# Patient Record
(demographics unavailable — no encounter records)

---

## 2017-02-16 NOTE — HM
Date Performed: 02/15/2017       Time Performed: 12:45:00

 

HOOKUP DATE:      02/15/17 12:45:00 PM Wed 

 

     

ANALYSIS START TIME:      2/15/2017 12:50:00 PM

     

ANALYSIS END TIME:      2/16/2017 12:54:00 PM

     

PATIENT AGE:      51

     

PATIENT HEIGHT     

     

PATIENT WEIGHT     

     

DRUG LIST:      OP

     

PATIENT DIAGNOSIS:      Rapid heart beat

     

TEST NARRATIVE:          The patient's average heart rate was 102 BPM.  Heart rates greater than 120 
BPM were noted 21% of the time.  No episodes of bradycardia were noted.     No pauses exceeding 2.0 s
econds were noted.     4 ventricular ectopics, which represented < 1% of the total beat count, were n
oted.  The highest ventricular ectopic frequency occurred from 01:00 PM to 02:00 PM Wed.  During this
 time 1 VE(s) occurred.  Ventricular ectopics were observed as 4 isolated beat(s) only.  No couplets 
or runs were noted.     No supraventricular ectopics were noted.     Multiple episodes of ST depressi
on (defined as -1.0 mm or more)  were noted in channel 1.  The maximum depression of -2.9 mm occurred
 at 02:41:58 PM Wed.  No episodes of ST depression (defined as -1.0 mm or more) were noted in channel
 2.  No episodes of ST depression (defined as -1.0 mm or more) were noted in channel 3.      Diary en
tries were made but no corresponding times were included with the patient reported events.

     

TEST INTERPRETATION:      Sinus rhythm 

 

 and sinus tachycardia Occasional PACs Rare PVCs                                                     
               Signed by : Adam Virk
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
## 2017-09-18 NOTE — PD
HPI


Chief Complaint:  Back/ Neck Pain or Injury


Time Seen by Provider:  17:51


Travel History


International Travel<30 days:  No


Contact w/Intl Traveler<30days:  No


Traveled to known affect area:  No





History of Present Illness


HPI


52-year-old female presents emergency Department with complaint of back pain, 

mid way across an area of her back where he fan blade fell from a ceiling and 

hit her in the back today.  Denies encopresis, incontinence, saddle 

anesthesias.  Reports paresthesias greater than her normal, but denies loss of 

sensation.  Denies decreased range of motion or strength to all extremities.  

Denies IV drug use or cancer.  Denies fever, vomiting.  Denies change in urine 

or stool.  Has history of chronic back and hip pain.  Has taken her prescribed 

pain medication, Embeda and morphine today for symptom management.  Pain is 

aggravated with movement and palpation.  Allergy to acetaminophen, meloxicam, 

penicillin, tramadol, prochlorazine.  Has no other medical complaints.  No 

other modifying factors or associated signs and symptoms.





PFSH


Past Medical History


Arthritis:  Yes


Asthma:  Yes


Depression:  Yes (PTSD)


Cardiovascular Problems:  Yes


High Cholesterol:  Yes


COPD:  Yes


Diabetes:  Yes


Diminished Hearing:  Yes


Diverticulitis:  Yes


GERD:  Yes


Headaches:  Yes


Hepatitis:  Yes (HEP C)


Hiatal Hernia:  Yes


Hypertension:  Yes


Respiratory:  Yes


Immunizations Current:  Yes


Migraines:  Yes


Pneumonia:  Yes


Triglycerides - High:  Yes


Ulcer:  Yes


Menopausal:  Yes


Tubal Ligation:  Yes





Past Surgical History


 Section:  Yes (X 5)


Hysterectomy:  Yes


Tonsillectomy:  Yes


Other Surgery:  Yes (THROAT BIOPSY)





Social History


Alcohol Use:  Yes (social)


Tobacco Use:  Yes (1 ppd)


Substance Use:  No





Allergies-Medications


(Allergen,Severity, Reaction):  


Coded Allergies:  


     meloxicam (Verified  Allergy, Severe, 17)


 ALLERGY


 


     penicillin G (Verified  Allergy, Severe, 17)


 ALLERGY


 


     prochlorperazine (Verified  Allergy, Severe, 17)


 ALLERGY


 


     tramadol (Verified  Allergy, Severe, 17)


 ALLERGY


     acetaminophen (Verified  Adverse Reaction, Severe, 17)


 GI


Reported Meds & Prescriptions





Reported Meds & Active Scripts


Active


Morphine IR (Morphine Sulfate) 15 Mg Tab 15 Mg PO TID PRN


Embeda (Morphine-Naltrexone ER) 20-0.8 Mg Caper 1 Cap PO DAILY 4 Days


Nystatin Liq 100,000 unit/ml Susp 5 Ml SWISH-SWAL QID


Pantoprazole (Pantoprazole Sodium) 40 Mg Tab 40 Mg PO DAILY


Zocor (Simvastatin) 20 Mg Tab 20 Mg PO DAILY


Advair Diskus Inh (Fluticasone-Salmeterol Inh) 500-50 Mcg/Blist Aer 1 Puff INH 

BID


     Rinse mouth after use.


Ramipril 10 Mg Cap 10 Mg PO DAILY


Duloxetine DR (Duloxetine HCl) 20 Mg Capdr 20 Mg PO DAILY


Premarin Vaginal (Estrogens, Conjugated Vaginal) 0.625 Mg/Gm Cream 1 Gm VAGINAL 

HS


Byetta Inj (Exenatide) 10 Mcg/0.04 Ml Pfpen 10 Mcg SQ BID


     Use within 60 minutes before the two main


     meals of the day.


Duoneb (Ipratropium-Albuterol Neb) 0.5-2.5 Mg/3 Ml Neb 1 Nebule INH Q4HR NEB


Novolog Inj (Insulin Aspart) 100 Unit/Ml Inj 15 Units SQ TIDAC


Lantus Inj (Insulin Glargine) 100 Unit/Ml Inj 49 Units SQ BID


Gabapentin 600 Mg Tab 600 Mg PO HS


Novolog Inj (Insulin Aspart) 1,000 Unit/10 Ml Vial 2-12 Units SQ TIDAC


     Max dose at bedtime ( ) units; sugars less than 70,(0) units; sugars


     150-199,(2) units; sugars 200-249,(4) units; sugars 250-299,(7) units;


     sugars 300-349,(10) units; sugars greater than 349,(12)units


Ondansetron Odt 8 Mg Tab 8 Mg SL Q8H PRN


Flexeril (Cyclobenzaprine HCl) 5 Mg Tab 5 Mg PO HS


Accu-Chek Fastclix Lancet 1 Mis Mis 1 Ea .ROUTE TIDAC


[syringes]     6X DAILY


Miralax Powder (Polyethylene Glycol 3350 Powder) 17 Gm Powd 17 Gm PO DAILY


     Mix and dissolve one measuring cap-ful (17 grams) in water or juice.


Ditropan (Oxybutynin Chloride) 5 Mg Tab 5 Mg PO Q8HR


Linzess (Linaclotide) 290 Mcg Cap 290 Mcg PO DAILY


[needles]     TIDAC


Butalbital-Acetaminophen-Caffeine -40 Mg Cap 1 Cap PO Q4H PRN


     Do not exceed 6 capsules/day.


[walker]   Units  


[nasal cannula]     CONTINUOUS


[oxygen concentrator]     CONTINUOUS


Reported


Morphine ER (Morphine Sulfate) 15 Mg Tab 15 Mg PO Q8H








Review of Systems


Except as stated in HPI:  all other systems reviewed are Neg





Physical Exam


Narrative


GENERAL: Well-nourished, well-developed  female patient, in no acute 

distress; afebrile, nontoxic-appearing


SKIN: Warm and dry.  Erythemic Line across the mid back noted, consistent with 

the edge of a fan blade.


HEAD: Atraumatic. Normocephalic. 


EYES: Pupils equal and round. No scleral icterus. No injection or drainage.


ENT: Mucosa pink and moist.  Airway patent.


NECK: Trachea midline.


CARDIOVASCULAR: Regular rate.  


RESPIRATORY: No accessory muscle use.  


GASTROINTESTINAL: Obese.


MUSCULOSKELETAL:  Bilateral lower extremities supple and non-tense with 2+ 

pedal pulses and sensory intact; with full range of motion and 5/5 strength.  

Active dorsiflexion and extension of bilateral feet.   Ambulatory in room with 

guarded gait.  Sitting up in bed at 90.  No obvious deformities. No clubbing.  

No cyanosis.  No edema. 


BACK:  Midline point tenderness on palpation of the thoracic spine , 

specifically to the area where the family blade hit.  Tenderness on palpation 

bilaterally across the area of the fan blade bebeto on the back.  No obvious 

deformities.


NEUROLOGICAL: Awake and alert.  Oriented 3.  No obvious cranial nerve 

deficits.  Motor grossly within normal limits. Normal speech.  Moves all 

extremities.  5/5 strength to all extremities.  Sensory intact.


PSYCHIATRIC: Appropriate mood and affect; insight and judgment normal.





Data


Data


Last Documented VS





Vital Signs








  Date Time  Temp Pulse Resp B/P (MAP) Pulse Ox O2 Delivery O2 Flow Rate FiO2


 


17 17:40  100      


 


17 17:34 99.3  24  98 Room Air  








Orders





 Orders


Spine, Thoracic-Ap/Lat/Sw(3vw) (17 17:52)








MDM


Medical Decision Making


Medical Screen Exam Complete:  Yes


Emergency Medical Condition:  Yes


Medical Record Reviewed:  Yes


Differential Diagnosis


Contusion, fracture, back injury


Narrative Course


52-year-old female with a contusion bebeto to her mid back consistent with the 

edge of the than blade.  Denies encopresis coming consciousness, saddle 

anesthesias.  Patient has midline tenderness at the point where the family 

blade hit her back the thoracic spine.  She is ambulatory in the room with a 

guarded gait secondary to chronic hip pain.  Patient has chronic back pain and 

is on pain medications.  Has taken Embeda and morphine today.  I did review the 

medical records and patient was seen here yesterday and was given refills for 

her morphine and Embeda.  I will ordered an x-ray of the thoracic spine to rule 

out acute findings.  Thoracic spine x-ray ordered.


1859:  Thoracic spine x-ray concludes:  Degenerative changes throughout the 

thoracic spine.


2. No acute fracture or subluxation.


3. Cervical spondylosis at C4-5, C5-6 and C6-7.  She has pain medications 

prescribed for home.  Instructed patient to continue medications as prescribed.

  Instructed patient to follow up with primary care provider.  Patient 

verbalizes understanding and agreement with treatment plan.  Patient is 

medically cleared and stable for discharge.  Discussed reasons to return to the 

emergency department.  Patient agrees with treatment plan.  The patients vital 

signs are stable and the patient is stable for outpatient follow-up and 

treatment.  Patient discharged home, stable and in no acute distress.





Diagnosis





 Primary Impression:  


 Contusion of back


 Qualified Codes:  S20.229A - Contusion of unspecified back wall of thorax, 

initial encounter


Referrals:  


Primary Care Physician


Patient Instructions:  Contusion in Adults (ED), General Instructions





***Additional Instructions:  


Tylenol or ibuprofen as directed and as needed for pain


Robaxin as prescribed and as needed for muscle spasms


Heating pad and/or ice to affected area to reduce pain


Avoid aggravating activities; increase activity as tolerated


Follow-up with primary care provider


Return to emergency department immediately with worsening of symptoms


***Med/Other Pt SpecificInfo:  No Change to Meds, No Meds Exist/No RX given


Disposition:  01 DISCHARGE HOME


Condition:  Stable











Angie Collazo Sep 18, 2017 17:51

## 2017-09-18 NOTE — RADRPT
EXAM DATE/TIME:  09/18/2017 18:21 

 

[facility] COMPARISON:     

No previous studies available for comparison.

 

                     

INDICATIONS :     

Back pain after ceiling fan fell on her back.

                     

 

MEDICAL HISTORY :     

None.          

 

SURGICAL HISTORY :     

None.   

 

ENCOUNTER:     

Initial                                        

 

ACUITY:     

1 day      

 

PAIN SCORE:     

10/10

 

LOCATION:     

Bilateral middle back.

 

FINDINGS:     

Degenerative changes are noted throughout the thoracic spine. There is no acute compression fracture 
or subluxation of the thoracic spine.  The pedicles are intact bilaterally. Cervical spondylosis is n
oted at C4-5, C5-6 and C6-7.  

 

CONCLUSION:

1. Degenerative changes throughout the thoracic spine.  

2. No acute fracture or subluxation.  

3. Cervical spondylosis at C4-5, C5-6 and C6-7.       

 

 

 You Rodriguez MD on September 18, 2017 at 18:41                

Board Certified Radiologist.

 This report was verified electronically.